# Patient Record
Sex: FEMALE | Race: WHITE | Employment: FULL TIME | URBAN - METROPOLITAN AREA
[De-identification: names, ages, dates, MRNs, and addresses within clinical notes are randomized per-mention and may not be internally consistent; named-entity substitution may affect disease eponyms.]

---

## 2023-08-21 ENCOUNTER — OFFICE VISIT (OUTPATIENT)
Dept: PRIMARY CARE | Facility: CLINIC | Age: 63
End: 2023-08-21
Payer: COMMERCIAL

## 2023-08-21 VITALS
HEIGHT: 64 IN | WEIGHT: 240 LBS | OXYGEN SATURATION: 92 % | BODY MASS INDEX: 40.97 KG/M2 | SYSTOLIC BLOOD PRESSURE: 146 MMHG | HEART RATE: 90 BPM | DIASTOLIC BLOOD PRESSURE: 85 MMHG

## 2023-08-21 DIAGNOSIS — R91.1 LUNG NODULE: ICD-10-CM

## 2023-08-21 DIAGNOSIS — J44.9 COPD MIXED TYPE (MULTI): ICD-10-CM

## 2023-08-21 DIAGNOSIS — M79.7 FIBROMYALGIA: ICD-10-CM

## 2023-08-21 DIAGNOSIS — Z12.31 ENCOUNTER FOR SCREENING MAMMOGRAM FOR MALIGNANT NEOPLASM OF BREAST: ICD-10-CM

## 2023-08-21 DIAGNOSIS — Z13.220 LIPID SCREENING: Primary | ICD-10-CM

## 2023-08-21 DIAGNOSIS — R06.09 EXERTIONAL DYSPNEA: ICD-10-CM

## 2023-08-21 PROCEDURE — 99214 OFFICE O/P EST MOD 30 MIN: CPT | Performed by: FAMILY MEDICINE

## 2023-08-21 RX ORDER — CHOLECALCIFEROL (VITAMIN D3) 25 MCG
TABLET ORAL
COMMUNITY
Start: 2010-06-25

## 2023-08-21 RX ORDER — CYCLOBENZAPRINE HCL 10 MG
10 TABLET ORAL 2 TIMES DAILY PRN
Qty: 60 TABLET | Refills: 1 | Status: SHIPPED | OUTPATIENT
Start: 2023-08-21

## 2023-08-21 RX ORDER — CYCLOBENZAPRINE HCL 10 MG
10 TABLET ORAL 2 TIMES DAILY PRN
COMMUNITY
Start: 2011-09-27 | End: 2023-08-21 | Stop reason: SDUPTHER

## 2023-08-21 NOTE — PROGRESS NOTES
Subjective   Patient ID: Kelsi Kyle is a 63 y.o. female.    HPI  Patient with multiple medical issues needs follow-up blood work as well as medication review and refill.  Patient also needs reevaluation of her cardiac as well as lipid status.    Patient at this point has no significant cardiovascular complaints shortness of breath or palpitations  Review of Systems   Constitutional: Negative.    HENT: Negative.     Respiratory: Negative.     Cardiovascular: Negative.    Genitourinary: Negative.    Musculoskeletal: Negative.    Skin: Negative.        Objective   Physical Exam  General no acute process no icterus well-hydrated alert active oriented    HEENT normocephalic no palpable tenderness eyes pupils equal reactive light and accommodation extraocular muscles intact no icterus and/or erythema ears benign external auditory canal no gross deformities nose no discharge drainage erythema bleeding throat no erythema.    Heart regular rate and rhythm without S3-S4 or murmur    Lungs clear to auscultation x2 no rales or rhonchi    Abdomen soft nontender nondistended no palpable masses no organomegaly splenomegaly.    Integument no rash no lumps bumps or concerning lesions.    Neurologic no tics tremors or seizures no decreased range of motion or ataxia.    Musculoskeletal good range of motion no gross abnormalities noted  Assessment/Plan   There are no diagnoses linked to this encounter.

## 2023-08-22 ASSESSMENT — ENCOUNTER SYMPTOMS
CARDIOVASCULAR NEGATIVE: 1
CONSTITUTIONAL NEGATIVE: 1
MUSCULOSKELETAL NEGATIVE: 1
RESPIRATORY NEGATIVE: 1

## 2023-09-27 LAB
ALANINE AMINOTRANSFERASE (SGPT) (U/L) IN SER/PLAS: 12 U/L (ref 7–45)
ALBUMIN (G/DL) IN SER/PLAS: 3.9 G/DL (ref 3.4–5)
ALKALINE PHOSPHATASE (U/L) IN SER/PLAS: 84 U/L (ref 33–136)
ANION GAP IN SER/PLAS: 12 MMOL/L (ref 10–20)
ASPARTATE AMINOTRANSFERASE (SGOT) (U/L) IN SER/PLAS: 13 U/L (ref 9–39)
BILIRUBIN TOTAL (MG/DL) IN SER/PLAS: 1.7 MG/DL (ref 0–1.2)
CALCIUM (MG/DL) IN SER/PLAS: 9.1 MG/DL (ref 8.6–10.6)
CARBON DIOXIDE, TOTAL (MMOL/L) IN SER/PLAS: 26 MMOL/L (ref 21–32)
CHLORIDE (MMOL/L) IN SER/PLAS: 108 MMOL/L (ref 98–107)
CHOLESTEROL (MG/DL) IN SER/PLAS: 188 MG/DL (ref 0–199)
CHOLESTEROL IN HDL (MG/DL) IN SER/PLAS: 58.8 MG/DL
CHOLESTEROL/HDL RATIO: 3.2
CREATININE (MG/DL) IN SER/PLAS: 0.75 MG/DL (ref 0.5–1.05)
ERYTHROCYTE DISTRIBUTION WIDTH (RATIO) BY AUTOMATED COUNT: 13.3 % (ref 11.5–14.5)
ERYTHROCYTE MEAN CORPUSCULAR HEMOGLOBIN CONCENTRATION (G/DL) BY AUTOMATED: 32.8 G/DL (ref 32–36)
ERYTHROCYTE MEAN CORPUSCULAR VOLUME (FL) BY AUTOMATED COUNT: 93 FL (ref 80–100)
ERYTHROCYTES (10*6/UL) IN BLOOD BY AUTOMATED COUNT: 4.29 X10E12/L (ref 4–5.2)
GFR FEMALE: 89 ML/MIN/1.73M2
GLUCOSE (MG/DL) IN SER/PLAS: 111 MG/DL (ref 74–99)
HEMATOCRIT (%) IN BLOOD BY AUTOMATED COUNT: 40 % (ref 36–46)
HEMOGLOBIN (G/DL) IN BLOOD: 13.1 G/DL (ref 12–16)
LDL: 105 MG/DL (ref 0–99)
LEUKOCYTES (10*3/UL) IN BLOOD BY AUTOMATED COUNT: 4.5 X10E9/L (ref 4.4–11.3)
NRBC (PER 100 WBCS) BY AUTOMATED COUNT: 0 /100 WBC (ref 0–0)
PLATELETS (10*3/UL) IN BLOOD AUTOMATED COUNT: 166 X10E9/L (ref 150–450)
POTASSIUM (MMOL/L) IN SER/PLAS: 4.1 MMOL/L (ref 3.5–5.3)
PROTEIN TOTAL: 6.5 G/DL (ref 6.4–8.2)
SODIUM (MMOL/L) IN SER/PLAS: 142 MMOL/L (ref 136–145)
THYROTROPIN (MIU/L) IN SER/PLAS BY DETECTION LIMIT <= 0.05 MIU/L: 1.34 MIU/L (ref 0.44–3.98)
TRIGLYCERIDE (MG/DL) IN SER/PLAS: 123 MG/DL (ref 0–149)
UREA NITROGEN (MG/DL) IN SER/PLAS: 11 MG/DL (ref 6–23)
VLDL: 25 MG/DL (ref 0–40)

## 2023-10-02 DIAGNOSIS — R17 ELEVATED BILIRUBIN: ICD-10-CM

## 2023-10-02 DIAGNOSIS — Z12.11 ENCOUNTER FOR SCREENING COLONOSCOPY: ICD-10-CM

## 2023-10-02 DIAGNOSIS — R73.09 ELEVATED GLUCOSE: Primary | ICD-10-CM

## 2023-10-11 ENCOUNTER — APPOINTMENT (OUTPATIENT)
Dept: PRIMARY CARE | Facility: CLINIC | Age: 63
End: 2023-10-11
Payer: COMMERCIAL

## 2023-10-18 ENCOUNTER — LAB (OUTPATIENT)
Dept: LAB | Facility: LAB | Age: 63
End: 2023-10-18
Payer: COMMERCIAL

## 2023-10-18 DIAGNOSIS — R06.09 EXERTIONAL DYSPNEA: ICD-10-CM

## 2023-10-18 DIAGNOSIS — Z13.220 LIPID SCREENING: ICD-10-CM

## 2023-10-18 DIAGNOSIS — R17 ELEVATED BILIRUBIN: ICD-10-CM

## 2023-10-18 DIAGNOSIS — R73.09 ELEVATED GLUCOSE: ICD-10-CM

## 2023-10-18 LAB
ALBUMIN SERPL BCP-MCNC: 3.9 G/DL (ref 3.4–5)
ALP SERPL-CCNC: 88 U/L (ref 33–136)
ALT SERPL W P-5'-P-CCNC: 14 U/L (ref 7–45)
ANION GAP SERPL CALC-SCNC: 10 MMOL/L (ref 10–20)
AST SERPL W P-5'-P-CCNC: 13 U/L (ref 9–39)
BILIRUB DIRECT SERPL-MCNC: 0.3 MG/DL (ref 0–0.3)
BILIRUB SERPL-MCNC: 1.6 MG/DL (ref 0–1.2)
BUN SERPL-MCNC: 14 MG/DL (ref 6–23)
CALCIUM SERPL-MCNC: 9.6 MG/DL (ref 8.6–10.6)
CHLORIDE SERPL-SCNC: 108 MMOL/L (ref 98–107)
CHOLEST SERPL-MCNC: 184 MG/DL (ref 0–199)
CHOLESTEROL/HDL RATIO: 3.1
CO2 SERPL-SCNC: 29 MMOL/L (ref 21–32)
CREAT SERPL-MCNC: 0.69 MG/DL (ref 0.5–1.05)
EST. AVERAGE GLUCOSE BLD GHB EST-MCNC: 91 MG/DL
GFR SERPL CREATININE-BSD FRML MDRD: >90 ML/MIN/1.73M*2
GLUCOSE SERPL-MCNC: 101 MG/DL (ref 74–99)
HBA1C MFR BLD: 4.8 %
HDLC SERPL-MCNC: 58.6 MG/DL
LDLC SERPL CALC-MCNC: 104 MG/DL
NON HDL CHOLESTEROL: 125 MG/DL (ref 0–149)
POTASSIUM SERPL-SCNC: 4.1 MMOL/L (ref 3.5–5.3)
PROT SERPL-MCNC: 6.4 G/DL (ref 6.4–8.2)
SODIUM SERPL-SCNC: 143 MMOL/L (ref 136–145)
TRIGL SERPL-MCNC: 105 MG/DL (ref 0–149)
TSH SERPL-ACNC: 0.43 MIU/L (ref 0.44–3.98)
VLDL: 21 MG/DL (ref 0–40)

## 2023-10-18 PROCEDURE — 36415 COLL VENOUS BLD VENIPUNCTURE: CPT

## 2023-10-18 PROCEDURE — 80053 COMPREHEN METABOLIC PANEL: CPT

## 2023-10-18 PROCEDURE — 82248 BILIRUBIN DIRECT: CPT

## 2023-10-18 PROCEDURE — 84443 ASSAY THYROID STIM HORMONE: CPT

## 2023-10-18 PROCEDURE — 80061 LIPID PANEL: CPT

## 2023-10-18 PROCEDURE — 83036 HEMOGLOBIN GLYCOSYLATED A1C: CPT

## 2023-10-19 ENCOUNTER — ANCILLARY PROCEDURE (OUTPATIENT)
Dept: RADIOLOGY | Facility: CLINIC | Age: 63
End: 2023-10-19
Payer: COMMERCIAL

## 2023-10-19 DIAGNOSIS — Z12.31 ENCOUNTER FOR SCREENING MAMMOGRAM FOR MALIGNANT NEOPLASM OF BREAST: ICD-10-CM

## 2023-10-19 PROCEDURE — 77063 BREAST TOMOSYNTHESIS BI: CPT | Mod: BILATERAL PROCEDURE | Performed by: RADIOLOGY

## 2023-10-19 PROCEDURE — 77067 SCR MAMMO BI INCL CAD: CPT | Mod: BILATERAL PROCEDURE | Performed by: RADIOLOGY

## 2023-10-19 PROCEDURE — 77067 SCR MAMMO BI INCL CAD: CPT | Mod: 50

## 2023-10-23 ENCOUNTER — TELEPHONE (OUTPATIENT)
Dept: PRIMARY CARE | Facility: CLINIC | Age: 63
End: 2023-10-23
Payer: COMMERCIAL

## 2023-10-23 DIAGNOSIS — R17 ELEVATED BILIRUBIN: Primary | ICD-10-CM

## 2023-10-23 NOTE — TELEPHONE ENCOUNTER
Spoke with patient:  Normal liver and kidney function.   History of IBS    Will recheck bilirubin next week  Patient is presently asymptomatic.

## 2023-10-25 ENCOUNTER — APPOINTMENT (OUTPATIENT)
Dept: PRIMARY CARE | Facility: CLINIC | Age: 63
End: 2023-10-25
Payer: COMMERCIAL

## 2023-10-27 ENCOUNTER — TELEPHONE (OUTPATIENT)
Dept: PRIMARY CARE | Facility: CLINIC | Age: 63
End: 2023-10-27
Payer: COMMERCIAL

## 2023-10-27 NOTE — TELEPHONE ENCOUNTER
----- Message from Eddi Scales DO sent at 10/27/2023 12:49 PM EDT -----  Normal mammogram recheck in 1 year

## 2023-10-31 ENCOUNTER — TELEPHONE (OUTPATIENT)
Dept: PRIMARY CARE | Facility: CLINIC | Age: 63
End: 2023-10-31
Payer: COMMERCIAL

## 2023-10-31 NOTE — TELEPHONE ENCOUNTER
----- Message from Eddi Scales, DO sent at 10/27/2023 12:31 PM EDT -----  Blood work looks good thyroid is just a tiny bit off recheck it in 6 months

## 2023-10-31 NOTE — TELEPHONE ENCOUNTER
L/M sumaya Dolan to call back for results :  Blood work looks good thyroid is just a tiny bit off recheck it in 6 months

## 2024-07-09 ENCOUNTER — TELEPHONE (OUTPATIENT)
Dept: PRIMARY CARE | Facility: CLINIC | Age: 64
End: 2024-07-09
Payer: COMMERCIAL

## 2024-09-10 ENCOUNTER — TELEPHONE (OUTPATIENT)
Dept: PRIMARY CARE | Facility: CLINIC | Age: 64
End: 2024-09-10
Payer: COMMERCIAL

## 2024-09-23 ENCOUNTER — TELEPHONE (OUTPATIENT)
Dept: PRIMARY CARE | Facility: CLINIC | Age: 64
End: 2024-09-23
Payer: COMMERCIAL

## 2024-09-23 DIAGNOSIS — R91.1 LUNG NODULE: Primary | ICD-10-CM

## 2024-09-23 NOTE — TELEPHONE ENCOUNTER
Patient called and updated on plan of care. Direct number given for scheduling CT when provider places order.

## 2024-10-14 ENCOUNTER — HOSPITAL ENCOUNTER (OUTPATIENT)
Dept: RADIOLOGY | Facility: CLINIC | Age: 64
Discharge: HOME | End: 2024-10-14
Payer: COMMERCIAL

## 2024-10-14 DIAGNOSIS — R91.1 LUNG NODULE: ICD-10-CM

## 2024-10-14 PROCEDURE — 71250 CT THORAX DX C-: CPT

## 2024-10-14 PROCEDURE — 71250 CT THORAX DX C-: CPT | Performed by: RADIOLOGY

## 2024-10-17 ENCOUNTER — TELEPHONE (OUTPATIENT)
Dept: PRIMARY CARE | Facility: CLINIC | Age: 64
End: 2024-10-17
Payer: COMMERCIAL

## 2024-10-21 PROBLEM — J01.90 ACUTE SINUSITIS: Status: ACTIVE | Noted: 2024-10-21

## 2024-10-21 PROBLEM — R73.09 INCREASED GLUCOSE LEVEL: Status: ACTIVE | Noted: 2024-10-21

## 2024-10-21 PROBLEM — R42 LIGHTHEADEDNESS: Status: ACTIVE | Noted: 2024-10-21

## 2024-10-21 PROBLEM — J06.9 ACUTE UPPER RESPIRATORY INFECTION: Status: ACTIVE | Noted: 2023-07-11

## 2024-10-21 PROBLEM — B49 INFECTION DUE TO FUNGUS: Status: ACTIVE | Noted: 2024-10-21

## 2024-10-21 PROBLEM — R10.12 ABDOMINAL PAIN, LUQ (LEFT UPPER QUADRANT): Status: ACTIVE | Noted: 2024-10-21

## 2024-10-21 PROBLEM — R10.32 ABDOMINAL PAIN, LLQ (LEFT LOWER QUADRANT): Status: ACTIVE | Noted: 2024-10-21

## 2024-10-21 PROBLEM — E66.9 OBESITY: Status: ACTIVE | Noted: 2024-10-21

## 2024-10-21 PROBLEM — M19.90 ARTHRITIS: Status: ACTIVE | Noted: 2024-10-21

## 2024-10-21 PROBLEM — H81.10 BENIGN PAROXYSMAL POSITIONAL VERTIGO: Status: ACTIVE | Noted: 2024-10-21

## 2024-10-21 PROBLEM — H66.92 ACUTE LEFT OTITIS MEDIA: Status: ACTIVE | Noted: 2024-10-21

## 2024-10-21 PROBLEM — R10.32 LEFT INGUINAL PAIN: Status: ACTIVE | Noted: 2024-10-21

## 2024-10-21 PROBLEM — R91.8 MULTIPLE PULMONARY NODULES: Status: ACTIVE | Noted: 2024-10-21

## 2024-10-21 PROBLEM — B37.9 YEAST INFECTION: Status: ACTIVE | Noted: 2024-10-21

## 2024-10-22 ENCOUNTER — TELEMEDICINE (OUTPATIENT)
Dept: PRIMARY CARE | Facility: CLINIC | Age: 64
End: 2024-10-22
Payer: COMMERCIAL

## 2024-10-22 DIAGNOSIS — E27.9 ADRENAL NODULE: Primary | ICD-10-CM

## 2024-10-22 DIAGNOSIS — J44.9 COPD MIXED TYPE (MULTI): ICD-10-CM

## 2024-10-22 DIAGNOSIS — M79.7 FIBROMYALGIA: ICD-10-CM

## 2024-10-22 DIAGNOSIS — R91.8 MULTIPLE PULMONARY NODULES: ICD-10-CM

## 2024-10-22 DIAGNOSIS — E04.1 THYROID NODULE: ICD-10-CM

## 2024-10-22 RX ORDER — CYCLOBENZAPRINE HCL 10 MG
10 TABLET ORAL 2 TIMES DAILY PRN
Qty: 60 TABLET | Refills: 0 | Status: SHIPPED | OUTPATIENT
Start: 2024-10-22

## 2024-10-22 SDOH — ECONOMIC STABILITY: FOOD INSECURITY: WITHIN THE PAST 12 MONTHS, THE FOOD YOU BOUGHT JUST DIDN'T LAST AND YOU DIDN'T HAVE MONEY TO GET MORE.: NEVER TRUE

## 2024-10-22 SDOH — ECONOMIC STABILITY: FOOD INSECURITY: WITHIN THE PAST 12 MONTHS, YOU WORRIED THAT YOUR FOOD WOULD RUN OUT BEFORE YOU GOT MONEY TO BUY MORE.: NEVER TRUE

## 2024-10-22 ASSESSMENT — PATIENT HEALTH QUESTIONNAIRE - PHQ9
1. LITTLE INTEREST OR PLEASURE IN DOING THINGS: NOT AT ALL
SUM OF ALL RESPONSES TO PHQ9 QUESTIONS 1 AND 2: 0
2. FEELING DOWN, DEPRESSED OR HOPELESS: NOT AT ALL

## 2024-10-22 ASSESSMENT — COLUMBIA-SUICIDE SEVERITY RATING SCALE - C-SSRS
1. IN THE PAST MONTH, HAVE YOU WISHED YOU WERE DEAD OR WISHED YOU COULD GO TO SLEEP AND NOT WAKE UP?: NO
6. HAVE YOU EVER DONE ANYTHING, STARTED TO DO ANYTHING, OR PREPARED TO DO ANYTHING TO END YOUR LIFE?: NO
2. HAVE YOU ACTUALLY HAD ANY THOUGHTS OF KILLING YOURSELF?: NO

## 2024-10-22 ASSESSMENT — ENCOUNTER SYMPTOMS
DEPRESSION: 0
OCCASIONAL FEELINGS OF UNSTEADINESS: 0
LOSS OF SENSATION IN FEET: 0

## 2024-10-22 ASSESSMENT — LIFESTYLE VARIABLES
HOW MANY STANDARD DRINKS CONTAINING ALCOHOL DO YOU HAVE ON A TYPICAL DAY: 1 OR 2
HOW OFTEN DO YOU HAVE A DRINK CONTAINING ALCOHOL: MONTHLY OR LESS
SKIP TO QUESTIONS 9-10: 1
HOW OFTEN DO YOU HAVE SIX OR MORE DRINKS ON ONE OCCASION: NEVER
AUDIT-C TOTAL SCORE: 1

## 2024-10-22 ASSESSMENT — PAIN SCALES - GENERAL: PAINLEVEL_OUTOF10: 0-NO PAIN

## 2024-10-22 NOTE — PATIENT INSTRUCTIONS
Multiple pulmonary nodules measuring up to 5 mm noted on CT Chest.   Recommend CT chest 1 year.  Order placed.  Patient will be notified of results as they become available.    Thyroid nodule and adrenal nodule noted on CT chest.  Patient to have thyroid ultrasound.  She will be notified of results as they become available.  She was referred to endocrinology for further evaluation.    Fibromyalgia requesting refill on Flexeril.  Refill sent.

## 2024-10-22 NOTE — PROGRESS NOTES
"Subjective   Patient ID: Elisabeth Kyle \"Samantha" is a 64 y.o. female who presents for Follow-up (Elisabeth Kyle has a follow up visit with a CT scan prior./).  HPI 64-year-old male presents today for lung nodule clinic.    Telephone visit between Kelsi Kyle and Jane Lauren CNP at [facility] lung nodule clinic per patient request.    Former smoker and quit 1.5 years ago. She smoked since she was a teenager one pack per day. No personal or family history of cancer.    10/14/2024 CT chest without IV contrast   There is a 0.3 cm left lower lobe pulmonary nodule which is not  definitively visualized on prior exam (series 202, image 206). There  is a 0.4 cm left lower lobe pulmonary nodule which is out of the  field of view on prior exam (image 195). There is a 0.5 cm left lower  lobe pulmonary nodule which is also out of the field of view on prior  exam (image 179). There is a 0.3 cm right lower lobe pulmonary nodule  which is out of the field of view on prior exam (image 165).    7/11/2023 CT angio chest.  4 mm solid right lower lobe nodule on image number 130.                                                         2 mm solid nodule along the minor fissure on image number    122.                                                      2 mm solid nodule along the left major fissure on image number 113.                                                4 mm solid left lower lobe nodule on image number 146.                                                      2 mm solid left lower lobe nodule on image number 158.                                                      4 mm solid left lower lobe nodule on image number 134.     Review of Systems  Review of systems: Present-feeling well. Not present-chills, fatigue and fever.  Respiratory: Not present-difficulty breathing, cough, bloody sputum.  Cardiovascular: Not present-chest pain, palpitations, dyspnea on exertion.  Objective   There were no vitals taken for this " visit.   Assessment/Plan   Diagnoses and all orders for this visit:  Adrenal nodule  -     Referral to Endocrinology; Future  Thyroid nodule  -     US thyroid; Future  -     Referral to Endocrinology; Future  COPD mixed type (Multi)  -     CT chest wo IV contrast; Future  Multiple pulmonary nodules  -     CT chest wo IV contrast; Future  Fibromyalgia  -     cyclobenzaprine (Flexeril) 10 mg tablet; Take 1 tablet (10 mg) by mouth 2 times a day as needed for muscle spasms (please avoid alcohol and operating machinery while taking this medication.).

## 2024-11-04 ENCOUNTER — HOSPITAL ENCOUNTER (OUTPATIENT)
Dept: RADIOLOGY | Facility: CLINIC | Age: 64
Discharge: HOME | End: 2024-11-04
Payer: COMMERCIAL

## 2024-11-04 DIAGNOSIS — E04.1 THYROID NODULE: ICD-10-CM

## 2024-11-04 PROCEDURE — 76536 US EXAM OF HEAD AND NECK: CPT

## 2024-11-04 PROCEDURE — 76536 US EXAM OF HEAD AND NECK: CPT | Performed by: RADIOLOGY

## 2024-11-06 ENCOUNTER — TELEPHONE (OUTPATIENT)
Dept: PRIMARY CARE | Facility: CLINIC | Age: 64
End: 2024-11-06
Payer: COMMERCIAL

## 2024-11-12 ENCOUNTER — TELEPHONE (OUTPATIENT)
Dept: PRIMARY CARE | Facility: CLINIC | Age: 64
End: 2024-11-12
Payer: COMMERCIAL

## 2024-11-12 NOTE — TELEPHONE ENCOUNTER
Patient called and reported she is attempting to return your call for clarification on referral and US test results. Please return her call to [phone].

## 2024-11-15 ENCOUNTER — TELEPHONE (OUTPATIENT)
Dept: PRIMARY CARE | Facility: CLINIC | Age: 64
End: 2024-11-15
Payer: COMMERCIAL

## 2024-11-15 DIAGNOSIS — E04.2 MULTIPLE THYROID NODULES: Primary | ICD-10-CM

## 2024-11-15 NOTE — TELEPHONE ENCOUNTER
Spoke with Kelsi re: thyroid nodules.   She has follow up with Endocrinology in April   She was referred to Dr. Albin Minaya for FNA consideration of multinodular thyroid

## 2024-12-11 ENCOUNTER — OFFICE VISIT (OUTPATIENT)
Dept: OTOLARYNGOLOGY | Facility: CLINIC | Age: 64
End: 2024-12-11
Payer: COMMERCIAL

## 2024-12-11 VITALS
BODY MASS INDEX: 44.03 KG/M2 | DIASTOLIC BLOOD PRESSURE: 83 MMHG | OXYGEN SATURATION: 94 % | HEART RATE: 103 BPM | TEMPERATURE: 98.7 F | HEIGHT: 66 IN | WEIGHT: 274 LBS | RESPIRATION RATE: 16 BRPM | SYSTOLIC BLOOD PRESSURE: 155 MMHG

## 2024-12-11 DIAGNOSIS — H93.11 TINNITUS OF RIGHT EAR: ICD-10-CM

## 2024-12-11 DIAGNOSIS — E04.2 NONTOXIC MULTINODULAR GOITER: ICD-10-CM

## 2024-12-11 DIAGNOSIS — R63.5 WEIGHT GAIN: Primary | ICD-10-CM

## 2024-12-11 PROCEDURE — 99204 OFFICE O/P NEW MOD 45 MIN: CPT | Performed by: STUDENT IN AN ORGANIZED HEALTH CARE EDUCATION/TRAINING PROGRAM

## 2024-12-11 PROCEDURE — 1036F TOBACCO NON-USER: CPT | Performed by: STUDENT IN AN ORGANIZED HEALTH CARE EDUCATION/TRAINING PROGRAM

## 2024-12-11 PROCEDURE — 99214 OFFICE O/P EST MOD 30 MIN: CPT | Performed by: STUDENT IN AN ORGANIZED HEALTH CARE EDUCATION/TRAINING PROGRAM

## 2024-12-11 PROCEDURE — 3008F BODY MASS INDEX DOCD: CPT | Performed by: STUDENT IN AN ORGANIZED HEALTH CARE EDUCATION/TRAINING PROGRAM

## 2024-12-11 SDOH — ECONOMIC STABILITY: FOOD INSECURITY: WITHIN THE PAST 12 MONTHS, THE FOOD YOU BOUGHT JUST DIDN'T LAST AND YOU DIDN'T HAVE MONEY TO GET MORE.: NEVER TRUE

## 2024-12-11 SDOH — ECONOMIC STABILITY: FOOD INSECURITY: WITHIN THE PAST 12 MONTHS, YOU WORRIED THAT YOUR FOOD WOULD RUN OUT BEFORE YOU GOT MONEY TO BUY MORE.: NEVER TRUE

## 2024-12-11 ASSESSMENT — PATIENT HEALTH QUESTIONNAIRE - PHQ9
SUM OF ALL RESPONSES TO PHQ9 QUESTIONS 1 AND 2: 0
1. LITTLE INTEREST OR PLEASURE IN DOING THINGS: NOT AT ALL
2. FEELING DOWN, DEPRESSED OR HOPELESS: NOT AT ALL

## 2024-12-11 ASSESSMENT — COLUMBIA-SUICIDE SEVERITY RATING SCALE - C-SSRS
6. HAVE YOU EVER DONE ANYTHING, STARTED TO DO ANYTHING, OR PREPARED TO DO ANYTHING TO END YOUR LIFE?: NO
1. IN THE PAST MONTH, HAVE YOU WISHED YOU WERE DEAD OR WISHED YOU COULD GO TO SLEEP AND NOT WAKE UP?: NO
2. HAVE YOU ACTUALLY HAD ANY THOUGHTS OF KILLING YOURSELF?: NO

## 2024-12-11 ASSESSMENT — LIFESTYLE VARIABLES
HOW OFTEN DO YOU HAVE SIX OR MORE DRINKS ON ONE OCCASION: NEVER
SKIP TO QUESTIONS 9-10: 1
HOW OFTEN DO YOU HAVE A DRINK CONTAINING ALCOHOL: NEVER
AUDIT-C TOTAL SCORE: 0
HOW MANY STANDARD DRINKS CONTAINING ALCOHOL DO YOU HAVE ON A TYPICAL DAY: PATIENT DOES NOT DRINK

## 2024-12-11 ASSESSMENT — PAIN SCALES - GENERAL: PAINLEVEL_OUTOF10: 0-NO PAIN

## 2024-12-11 ASSESSMENT — ENCOUNTER SYMPTOMS
OCCASIONAL FEELINGS OF UNSTEADINESS: 1
DEPRESSION: 0
LOSS OF SENSATION IN FEET: 0

## 2024-12-11 NOTE — LETTER
"December 11, 2024     aJne Lauren, APRN-CNP  [address] [facility] [address]  [place] [address]    Patient: Kelsi Kyle   YOB: 1960   Date of Visit: 12/11/2024       Dear Dr. Jane Lauren, APRN-CNP:    Thank you for referring Kelsi Kyle to me for evaluation. Below are my notes for this consultation.  If you have questions, please do not hesitate to call me. I look forward to following your patient along with you.       Sincerely,     Socrates Minaya MD      CC: No Recipients  ______________________________________________________________________________________    SUBJECTIVE  Patient ID: Elisabeth Kyle \"Kelsi\" is a 64 y.o. female who presents for New Patient Visit (Thyroid issues).    Referred by Jane Lauren.    The patient reports that there was concern for possible thyromegaly. There was concern that she might need an ultrasound-guided. No immediate family history of thyroid cancer nor disease. She has noticed significant weight gain in the last year. No constant voice concerns. Quit smoking some two years ago.    She has also noticed a piercing tinnitus on the right side. They deny hearing loss, otalgia, and otorrhea. Gets some vertigo when lying back; reports previous PT for Epley maneuver in the past. They deny a history of prior ear surgery, noise exposure, exposure to ototoxic drugs or agents, and family history of hearing loss.     Review of Systems  Complete ROS negative except as noted above or on patient intake form and as above.    OBJECTIVE  Physical Exam  CONSTITUTIONAL: Well appearing female who appears stated age. BMI 44.9.  PSYCHIATRIC: Alert, appropriate mood and affect.  RESPIRATORY: Normal inspiration and expiration and chest wall expansion; no use of accessory muscles to breathe.  VOICE: Clear speech without hoarseness. No stridor nor stertor.  HEAD, FACE, AND SKIN: Symmetric facial feature. No cutaneous masses or lesions were visualized. The parotid and " submandibular glands were normal to palpation.  EYES: Pupils were equal in size and reactive to light. Extra-ocular muscle function was intact. No nystagmus was observed. Vision was grossly intact.  EARS: External ears were normally formed with no lesions. The external auditory canals were clear. The tympanic membranes were intact and in the neutral position. No significant retraction pockets nor effusions were appreciated.  NOSE: Nasal dorsum was midline.  ORAL CAVITY: Lips were without lesions. Moist mucous membranes. No lesions appreciated along the gingiva, oral mucosa, nor tongue.  DENTITION: Upper and lower dentures in place.  She denies any sores.  OROPHARYNX: No lesion nor mucosal abnormality. The uvula was normal appearing. The tonsils were surgically absent; some residual tonsil tissue on the right.  No masses nor lesions appreciated on palpation of floor of mouth, base of tongue, nor tonsillar fossae.  NECK: Visualization and palpation of the neck revealed no mass lesions, no thyromegaly or thyroid masses. No cutaneous lesions appreciated.  LYMPHATICS (CERVICAL): There were no palpable lymph nodes in the posterior triangle, submandibular triangle, jugulodigastric region, nor central neck.  NEUROLOGIC: Cranial nerves III, IV, and VI were noted to be intact via extra-ocular muscle movement testing. Cranial nerve V was noted to be intact to soft touch bilaterally. Cranial nerve VII was noted to be intact and symmetric by facial movement. Cranial nerve VIII was tested with normal voice examination and revealed grossly normal hearing. Cranial nerves IX and X noted to be intact by palatal movement. Cranial nerve XI noted to be intact via shoulder shrug.  Cranial nerve XII noted to be intact with active and symmetric tongue movement.     Labwork  Lab Results   Component Value Date    TSH 0.43 (L) 10/18/2023     Radiology  Thyroid ultrasound 11/4/2024 reviewed: Two nodules that do not meet criteria for  FNA    ASSESSMENT/PLAN  Diagnoses and all orders for this visit:  Weight gain  -     TSH with reflex to Free T4 if abnormal; Future  -     US thyroid; Future  Nontoxic multinodular goiter  -     TSH with reflex to Free T4 if abnormal; Future  -     US thyroid; Future  Tinnitus of right ear      64 y.o. female presenting with several issues.    1.  Multinodular goiter  The patient reports that there was concern for possible goiter noted on exam.  Thyroid ultrasound confirmed multinodular goiter with several larger nodules.  There is a TI-RADS 3 and TI-RADS 4 within the right hemithyroid.  At this time these do not meet criteria for FNA.  I recommended observation and follow-up ultrasound in 1 year.  We will have a follow-up to discuss the results.    2.  Right sided tinnitus  The patient also notes longstanding right-sided tinnitus.  While she denies other otologic symptoms, I recommended that given its unilateral nature she obtain further testing with audiogram.  We will arrange for follow-up with audiogram at her convenience.    3.  Vertigo, suspected BPPV  The patient is also noting episodes of vertigo when lying back to the right.  She notes a history of prior BPPV controlled with the Epley maneuver.  We discussed the natural history of this pathology and the option to continue physical therapy.  For now she wishes for observation.    4.  Weight gain  The patient is also appreciated significant weight gain over the last year.  We discussed that I would happily check her thyroid level but that this is something that is better treated by a primary care doctor or a weight specialist.  We discussed possible referral to phentermine, but she reports that she will make a follow-up visit with Ms. Lauren in the new year to discuss.    This note was created using speech recognition transcription software. Despite proofreading, typographical errors may be present that affect the meaning of the content. Please contact my  office with any questions.

## 2024-12-11 NOTE — PROGRESS NOTES
"SUBJECTIVE  Patient ID: Elisabeth Kyle \"Kelsi\" is a 64 y.o. female who presents for New Patient Visit (Thyroid issues).    Referred by Jane Lauren.    The patient reports that there was concern for possible thyromegaly. There was concern that she might need an ultrasound-guided. No immediate family history of thyroid cancer nor disease. She has noticed significant weight gain in the last year. No constant voice concerns. Quit smoking some two years ago.    She has also noticed a piercing tinnitus on the right side. They deny hearing loss, otalgia, and otorrhea. Gets some vertigo when lying back; reports previous PT for Epley maneuver in the past. They deny a history of prior ear surgery, noise exposure, exposure to ototoxic drugs or agents, and family history of hearing loss.     Review of Systems  Complete ROS negative except as noted above or on patient intake form and as above.    OBJECTIVE  Physical Exam  CONSTITUTIONAL: Well appearing female who appears stated age. BMI 44.9.  PSYCHIATRIC: Alert, appropriate mood and affect.  RESPIRATORY: Normal inspiration and expiration and chest wall expansion; no use of accessory muscles to breathe.  VOICE: Clear speech without hoarseness. No stridor nor stertor.  HEAD, FACE, AND SKIN: Symmetric facial feature. No cutaneous masses or lesions were visualized. The parotid and submandibular glands were normal to palpation.  EYES: Pupils were equal in size and reactive to light. Extra-ocular muscle function was intact. No nystagmus was observed. Vision was grossly intact.  EARS: External ears were normally formed with no lesions. The external auditory canals were clear. The tympanic membranes were intact and in the neutral position. No significant retraction pockets nor effusions were appreciated.  NOSE: Nasal dorsum was midline.  ORAL CAVITY: Lips were without lesions. Moist mucous membranes. No lesions appreciated along the gingiva, oral mucosa, nor tongue.  DENTITION: Upper " and lower dentures in place.  She denies any sores.  OROPHARYNX: No lesion nor mucosal abnormality. The uvula was normal appearing. The tonsils were surgically absent; some residual tonsil tissue on the right.  No masses nor lesions appreciated on palpation of floor of mouth, base of tongue, nor tonsillar fossae.  NECK: Visualization and palpation of the neck revealed no mass lesions, no thyromegaly or thyroid masses. No cutaneous lesions appreciated.  LYMPHATICS (CERVICAL): There were no palpable lymph nodes in the posterior triangle, submandibular triangle, jugulodigastric region, nor central neck.  NEUROLOGIC: Cranial nerves III, IV, and VI were noted to be intact via extra-ocular muscle movement testing. Cranial nerve V was noted to be intact to soft touch bilaterally. Cranial nerve VII was noted to be intact and symmetric by facial movement. Cranial nerve VIII was tested with normal voice examination and revealed grossly normal hearing. Cranial nerves IX and X noted to be intact by palatal movement. Cranial nerve XI noted to be intact via shoulder shrug.  Cranial nerve XII noted to be intact with active and symmetric tongue movement.     Labwork  Lab Results   Component Value Date    TSH 0.43 (L) 10/18/2023     Radiology  Thyroid ultrasound 11/4/2024 reviewed: Two nodules that do not meet criteria for FNA    ASSESSMENT/PLAN  Diagnoses and all orders for this visit:  Weight gain  -     TSH with reflex to Free T4 if abnormal; Future  -     US thyroid; Future  Nontoxic multinodular goiter  -     TSH with reflex to Free T4 if abnormal; Future  -     US thyroid; Future  Tinnitus of right ear      64 y.o. female presenting with several issues.    1.  Multinodular goiter  The patient reports that there was concern for possible goiter noted on exam.  Thyroid ultrasound confirmed multinodular goiter with several larger nodules.  There is a TI-RADS 3 and TI-RADS 4 within the right hemithyroid.  At this time these do not  meet criteria for FNA.  I recommended observation and follow-up ultrasound in 1 year.  We will have a follow-up to discuss the results.    2.  Right sided tinnitus  The patient also notes longstanding right-sided tinnitus.  While she denies other otologic symptoms, I recommended that given its unilateral nature she obtain further testing with audiogram.  We will arrange for follow-up with audiogram at her convenience.    3.  Vertigo, suspected BPPV  The patient is also noting episodes of vertigo when lying back to the right.  She notes a history of prior BPPV controlled with the Epley maneuver.  We discussed the natural history of this pathology and the option to continue physical therapy.  For now she wishes for observation.    4.  Weight gain  The patient is also appreciated significant weight gain over the last year.  We discussed that I would happily check her thyroid level but that this is something that is better treated by a primary care doctor or a weight specialist.  We discussed possible referral to phentermine, but she reports that she will make a follow-up visit with Ms. Lauren in the new year to discuss.    This note was created using speech recognition transcription software. Despite proofreading, typographical errors may be present that affect the meaning of the content. Please contact my office with any questions.

## 2025-03-12 ENCOUNTER — APPOINTMENT (OUTPATIENT)
Dept: OTOLARYNGOLOGY | Facility: CLINIC | Age: 65
End: 2025-03-12
Payer: COMMERCIAL

## 2025-03-12 ENCOUNTER — APPOINTMENT (OUTPATIENT)
Dept: AUDIOLOGY | Facility: CLINIC | Age: 65
End: 2025-03-12
Payer: COMMERCIAL

## 2025-04-11 ENCOUNTER — APPOINTMENT (OUTPATIENT)
Facility: CLINIC | Age: 65
End: 2025-04-11
Payer: COMMERCIAL

## 2025-07-11 ENCOUNTER — HOSPITAL ENCOUNTER (OUTPATIENT)
Dept: RADIOLOGY | Facility: CLINIC | Age: 65
Discharge: HOME | End: 2025-07-11
Payer: COMMERCIAL

## 2025-07-11 ENCOUNTER — OFFICE VISIT (OUTPATIENT)
Dept: ORTHOPEDIC SURGERY | Facility: CLINIC | Age: 65
End: 2025-07-11
Payer: COMMERCIAL

## 2025-07-11 ENCOUNTER — HOSPITAL ENCOUNTER (OUTPATIENT)
Dept: RADIOLOGY | Facility: EXTERNAL LOCATION | Age: 65
Discharge: HOME | End: 2025-07-11

## 2025-07-11 VITALS — BODY MASS INDEX: 36.05 KG/M2 | WEIGHT: 220 LBS

## 2025-07-11 DIAGNOSIS — M25.561 RIGHT KNEE PAIN, UNSPECIFIED CHRONICITY: ICD-10-CM

## 2025-07-11 DIAGNOSIS — M25.562 LEFT KNEE PAIN, UNSPECIFIED CHRONICITY: ICD-10-CM

## 2025-07-11 DIAGNOSIS — M17.0 PRIMARY OSTEOARTHRITIS OF BOTH KNEES: Primary | ICD-10-CM

## 2025-07-11 PROCEDURE — 73562 X-RAY EXAM OF KNEE 3: CPT | Mod: RT

## 2025-07-11 PROCEDURE — 1036F TOBACCO NON-USER: CPT | Performed by: FAMILY MEDICINE

## 2025-07-11 PROCEDURE — 1160F RVW MEDS BY RX/DR IN RCRD: CPT | Performed by: FAMILY MEDICINE

## 2025-07-11 PROCEDURE — 99204 OFFICE O/P NEW MOD 45 MIN: CPT | Performed by: FAMILY MEDICINE

## 2025-07-11 PROCEDURE — 1159F MED LIST DOCD IN RCRD: CPT | Performed by: FAMILY MEDICINE

## 2025-07-11 PROCEDURE — 20611 DRAIN/INJ JOINT/BURSA W/US: CPT | Performed by: FAMILY MEDICINE

## 2025-07-11 RX ORDER — LIDOCAINE HYDROCHLORIDE 20 MG/ML
2 INJECTION, SOLUTION INFILTRATION; PERINEURAL
Status: COMPLETED | OUTPATIENT
Start: 2025-07-11 | End: 2025-07-11

## 2025-07-11 RX ORDER — TRIAMCINOLONE ACETONIDE 40 MG/ML
40 INJECTION, SUSPENSION INTRA-ARTICULAR; INTRAMUSCULAR
Status: COMPLETED | OUTPATIENT
Start: 2025-07-11 | End: 2025-07-11

## 2025-07-11 RX ADMIN — LIDOCAINE HYDROCHLORIDE 2 ML: 20 INJECTION, SOLUTION INFILTRATION; PERINEURAL at 10:34

## 2025-07-11 RX ADMIN — TRIAMCINOLONE ACETONIDE 40 MG: 40 INJECTION, SUSPENSION INTRA-ARTICULAR; INTRAMUSCULAR at 10:34

## 2025-07-11 NOTE — PROGRESS NOTES
History of Present Illness   Chief Complaint   Patient presents with    Left Knee - Pain    Right Knee - Pain       The patient is 65 y.o. female  here with a complaint of bilateral knee pain.  Patient initial onset of left knee pain in April of this year after a fall, did not seek any medical attention, did have some bruising and swelling in her knee down her leg, since that time she has has a lingering pain over the medial aspect of her knee.  Says she was having some knee pain after laying for an exam in November as well.  She was seen in [facility] emergency department last week, she had x-rays obtained that were negative for any acute abnormality, did show moderate medial degenerative changes, recommended outpatient orthopedic follow-up.  With regards to her right knee she feels like she has been compensating some for her left knee discomfort over the past several months.  Pain is over the medial aspect of both knees most notably, she feels like knees do swell at times.  She takes over-the-counter medications as needed for pain.    Medical History[1]    Medication Documentation Review Audit       Reviewed by Jessica Coyne MA (Technologist) on 07/11/25 at 0955      Medication Order Taking? Sig Documenting Provider Last Dose Status   cholecalciferol (Vitamin D-3) 25 MCG (1000 UT) tablet [id number]  Take by mouth. Historical Provider, MD  Active   cyclobenzaprine (Flexeril) 10 mg tablet [id number]  Take 1 tablet (10 mg) by mouth 2 times a day as needed for muscle spasms (please avoid alcohol and operating machinery while taking this medication.). Jane Lauren, APRN-CNP  Active   ubidecarenone (COENZYME Q10 ORAL) [id number]  Take 1 tablet by mouth once daily.   Patient not taking: Reported on 12/11/2024    Historical Provider, MD  Active                    RX Allergies[2]    Social History     Socioeconomic History    Marital status:      Spouse name: Not on file    Number of children: Not on file    Years of  education: Not on file    Highest education level: Not on file   Occupational History    Not on file   Tobacco Use    Smoking status: Former     Current packs/day: 0.00     Average packs/day: 1 pack/day for 15.0 years (15.0 ttl pk-yrs)     Types: Cigarettes     Quit date: 2023     Years since quittin.0    Smokeless tobacco: Never   Substance and Sexual Activity    Alcohol use: Not Currently     Comment: twice a year    Drug use: Not Currently     Comment: CBD gummies    Sexual activity: Not Currently   Other Topics Concern    Not on file   Social History Narrative    Not on file     Social Drivers of Health     Financial Resource Strain: Not on file   Food Insecurity: No Food Insecurity (2024)    Hunger Vital Sign     Worried About Running Out of Food in the Last Year: Never true     Ran Out of Food in the Last Year: Never true   Transportation Needs: Not on file   Physical Activity: Not on file   Stress: Not on file   Social Connections: Not on file   Intimate Partner Violence: Not on file   Housing Stability: Not on file       Surgical History[3]       Review of Systems   GENERAL: Negative  GI: Negative  MUSCULOSKELETAL: See HPI  SKIN: Negative  NEURO:  Negative     Physical Exam:    General/Constitutional: well appearing, no distress, appears stated age  HEENT: sclera clear  Respiratory: non labored breathing  Vascular: No edema, swelling or tenderness, except as noted in detailed exam.  Integumentary: No impressive skin lesions present, except as noted in detailed exam.  Neurological:  Alert and oriented   Psychological:  Normal mood and affect.  Musculoskeletal: Normal, except as noted in detailed exam and in HPI    Left knee: Normal appearance, no skin changes.  There is a trace joint effusion.  There is medial and lateral joint line tenderness.  Range of motion from 3 to 115 degrees with crepitus.  No motor deficits or gross ligamentous laxity    Right knee: Normal appearance, no skin changes.   Trace joint effusion is present.  There is medial and lateral joint line tenderness, range of motion from 5 to 110 degrees with pain and crepitus.  No motor deficits or gross ligamentous laxity present.       Imaging: X-rays of left knee from [facility] from 7/1/2025 independently reviewed, there is moderate medial and patellofemoral osteoarthritis    X-rays of right knee obtained today and independently reviewed, there is advanced degenerative changes with obliteration of medial joint space with associated varus    L Inj/Asp: bilateral knee on 7/11/2025 10:34 AM  Indications: pain  Details: 22 G needle, ultrasound-guided superolateral approach  Medications (Right): 40 mg triamcinolone acetonide 40 mg/mL; 2 mL lidocaine 20 mg/mL (2 %)  Medications (Left): 40 mg triamcinolone acetonide 40 mg/mL; 2 mL lidocaine 20 mg/mL (2 %)  Outcome: tolerated well, no immediate complications    Bilateral knee joint and surrounding structures were appropriately visualized before and during injection    Ultrasound images were permanently uploaded to the medical record/PACS  Procedure, treatment alternatives, risks and benefits explained, specific risks discussed. Consent was given by the patient. Immediately prior to procedure a time out was called to verify the correct patient, procedure, equipment, support staff and site/side marked as required. Patient was prepped and draped in the usual sterile fashion.             Assessment   1. Primary osteoarthritis of both knees  Point of Care Ultrasound      2. Right knee pain, unspecified chronicity  XR knee right 3 views      3. Left knee pain, unspecified chronicity              Plan: Bilateral knee pain secondary to underlying osteoarthritis, moderate on the left, advanced on the right.  Discussed further workup and treatment with patient.  We did proceed with ultrasound-guided bilateral knee joint injection with Kenalog today, she tolerated without issue, see procedure note for full  details.  Discussed definitive management of advanced knee osteoarthritis would be with knee replacement surgery.  Discussed importance of maintaining active lifestyle, healthy weight, has gained some weight, current BMI is just under 40.  Patient voiced understanding.  She will plan to follow-up as symptoms dictate         [1]   Past Medical History:  Diagnosis Date    COPD (chronic obstructive pulmonary disease) (Multi)     Dizziness     Ear problems Ringing in my right ear    Encounter for gynecological examination (general) (routine) without abnormal findings 2015    Encounter for gynecological examination    Fatigue     Fibromyalgia     Obesity     Personal history of other diseases of the musculoskeletal system and connective tissue     History of low back pain    Sleep difficulties     Tinnitus    [2]   Allergies  Allergen Reactions    Niacin Anxiety, Chills, Dermatitis, Dizziness, Dry mouth, Hives, Itching, Palpitations, Rash, Shortness of breath, Swelling and Tinnitus    Erythromycin Swelling     Throat swelling   [3]   Past Surgical History:  Procedure Laterality Date     SECTION, CLASSIC  2014     Section    TONSILLECTOMY  2014    Tonsillectomy